# Patient Record
(demographics unavailable — no encounter records)

---

## 2024-10-16 NOTE — HISTORY OF PRESENT ILLNESS
[Former] : former [< 20 pack-years] : < 20 pack-years [TextBox_4] : hx Sarcoidosis by Mediastinoscopy by Dr Guevara had bronchitis in recent past had dry cough in September, saw PMD gave Z pack and prednisone than took home Covid test was positive now baseline no fever, chill, chest pain no sig sputum no sig dyspnea    [YearQuit] : 1990 [FreeTextEntry1] : \par

## 2024-10-16 NOTE — PROCEDURE
[FreeTextEntry1] : CT 3/23 stable nodules and adenopathy CXR no acute change spirometry is normal today, no acute change

## 2024-10-16 NOTE — CONSULT LETTER
[Dear  ___] : Dear  [unfilled], [Consult Letter:] : I had the pleasure of evaluating your patient, [unfilled]. [Please see my note below.] : Please see my note below. [Consult Closing:] : Thank you very much for allowing me to participate in the care of this patient.  If you have any questions, please do not hesitate to contact me. [Sincerely,] : Sincerely, [DrRadha  ___] : Dr. CANO [[facility], [facility]] : Jared [facility], [facility] [Director, Respiratory Care] : Director, Respiratory Care [[facility]] : [facility] [FreeTextEntry3] : [facility] DO [facility]\par  Pulmonary Critical Care\par  Director Pulmonary Division\par  Medical Director Respiratory Therapy\par  [facility]\par  \par

## 2024-10-16 NOTE — DISCUSSION/SUMMARY
[FreeTextEntry1] : Sarcoidosis by mediastinoscopy  CT scan 3/23 stable nodules and adenopathy, minimal change to my review Sarcoid labs followed by PMD Spirometry remains super normal Lung  exam is normal, normal sp02 Cardiology following Dr Sears Opthalmology yearly Sarcoid labs ordered by medical team 6 months or sooner if needed

## 2024-10-16 NOTE — PHYSICAL EXAM
[General Appearance - Well Developed] : well developed [Normal Appearance] : normal appearance [General Appearance - Well Nourished] : well nourished [No Deformities] : no deformities [Normal Conjunctiva] : the conjunctiva exhibited no abnormalities [Normal Oropharynx] : normal oropharynx [II] : II [Neck Appearance] : the appearance of the neck was normal [Heart Rate And Rhythm] : heart rate and rhythm were normal [Heart Sounds] : normal S1 and S2 [Murmurs] : no murmurs present [Edema] : no peripheral edema present [Respiration, Rhythm And Depth] : normal respiratory rhythm and effort [Exaggerated Use Of Accessory Muscles For Inspiration] : no accessory muscle use [Auscultation Breath Sounds / Voice Sounds] : lungs were clear to auscultation bilaterally [Lungs Percussion] : the lungs were normal to percussion [Abnormal Walk] : normal gait [Nail Clubbing] : no clubbing of the fingernails [Cyanosis, Localized] : no localized cyanosis [Cranial Nerves] : cranial nerves 2-12 were intact [Deep Tendon Reflexes (DTR)] : deep tendon reflexes were 2+ and symmetric [Sensation] : the sensory exam was normal to light touch and pinprick [No Focal Deficits] : no focal deficits [Oriented To Time, Place, And Person] : oriented to person, place, and time [Impaired Insight] : insight and judgment were intact [Affect] : the affect was normal [Memory Recent] : recent memory was not impaired [] : no rash [FreeTextEntry1] : no chest wall abn

## 2024-10-16 NOTE — REASON FOR VISIT
[Follow-Up] : a follow-up visit [Abnormal CT Scan] : abnormal CT Scan [Chest Pain] : chest Pain [Sarcoidosis] : sarcoidosis [FreeTextEntry2] : sarcoid